# Patient Record
Sex: FEMALE | Race: WHITE
[De-identification: names, ages, dates, MRNs, and addresses within clinical notes are randomized per-mention and may not be internally consistent; named-entity substitution may affect disease eponyms.]

---

## 2020-10-18 ENCOUNTER — HOSPITAL ENCOUNTER (EMERGENCY)
Dept: HOSPITAL 74 - FER | Age: 50
LOS: 1 days | Discharge: HOME | End: 2020-10-19
Payer: COMMERCIAL

## 2020-10-18 VITALS — TEMPERATURE: 97.8 F | DIASTOLIC BLOOD PRESSURE: 70 MMHG | SYSTOLIC BLOOD PRESSURE: 130 MMHG | HEART RATE: 77 BPM

## 2020-10-18 VITALS — BODY MASS INDEX: 27.8 KG/M2

## 2020-10-18 DIAGNOSIS — R42: Primary | ICD-10-CM

## 2020-10-18 NOTE — PDOC
History of Present Illness





- General


Chief Complaint: Lightheaded


Stated Complaint: DIZZY, NAUSEA, HEADACHE


Time Seen by Provider: 10/18/20 23:01


History Source: Patient


Exam Limitations: No Limitations





- History of Present Illness


Initial Comments: 





10/18/20 23:06


This is a 50-year-old female who comes in complaining of carbon monoxide 

exposure.  Patient states she was working downstairs building something when she

suddenly developed some nausea, headache and feeling disoriented.  Patient does 

have a carbon monoxide detector in the area which did not go off.  Patient came 

in for evaluation of possible carbon monoxide exposure.  By the time patient got

here she was feeling better.  Otherwise patient was without complaints.





Allergies: as per nursing notes


Past Medical History: none


Social history: Lives with family. No smoking. No alcohol. No illicit drugs.


Surgical history: None





General:  No fevers or chills, no weakness, no weight loss 


HEENT: No change in vision.  No sore throat,. No ear pain


CardioVascular: no chest discomfort. No shortness of breath


Respiratory:No cough, or wheezing. 


Gastrointestinal:  no nausea, vomiting, diarrhea or constipation,  No rectal 

bleeding


Genitourinary:  No dysuria, hematuria, or frequency


Musculoskeletal:  No joint or muscle pain or swelling


Neurologic: No headache, vertigo, dizziness or loss of consciousness


Psychiatric: nor depression 


Skin: No rashes or easy bruising


Endocrine: no increased thirst or abnormal weight change


Allergic: no skin or latex allergy


All other systems reviewed and normal





Exam:





General: Well-nourished well-developed individual, no acute distress


HEENT: Throat: Normal, tonsils normal, no erythema or exudate


               Neck: Supple, no meningeal signs, no lymphadenopathy


Eyes::Pupils equal reactive and round, extraocular motion intact


Chest: Nontender to palpation 


Cardiac: S1-S2 normal, regular rate and rhythm, no murmurs rubs or gallops


Respiratory: Lungs clear to auscultation bilateral


Abdomen: Soft, nondistended, normal bowel sounds, there is no tenderness on 

palpation diffusely


Extremities: Warm, dry, no cyanosis, clubbing, or edema


Skin: No rashes


Neuro: Alert and oriented x3, CN II - XII intact, nonfocal exam with normal 

strength, normal sensation, normal reflexes, normal gait, 


Psych: Normal mood and affect


10/19/20 00:40


Assessment and plan: This is a 50-year-old female who comes in complaining of 

possible carbon monoxide exposure.  Patient's carbon monoxide level was 0.8 

normal is 0-2.  Patient also had a cardiogram which showed normal sinus rhythm 

no acute ST-T wave changes essentially normal EKG





Patient discharged we will follow-up with her primary care doctor





Past History





- Medical History


Allergies/Adverse Reactions: 


                                    Allergies











Allergy/AdvReac Type Severity Reaction Status Date / Time


 


No Known Allergies Allergy   Verified 10/18/20 22:45











Home Medications: 


Ambulatory Orders





Cetirizine HCl [Zyrtec] 10 mg PO DAILY 10/18/20 


Lansoprazole [Prevacid] 30 mg PO DAILY 10/18/20 


Multivitamin 1 each PO DAILY 10/18/20 








COPD: No


 Disorders: Yes


HTN: Yes (W/PREGNANCY)


Other medical history: ALLERGIES





- Reproductive History


Is Patient Pregnant Now?: No





- Psycho-Social/Smoking History


Smoking Status: No


Smoking History: Never smoked


Have you smoked in the past 12 months: No


Number of Cigarettes Smoked Daily: 0


Information on smoking cessation initiated: No





- Substance Abuse Hx (Audit-C & DAST Scrn)


How often the patient has a drink containing alcohol: Never


Score: In Men: 4 or > Positive; In Women: 3 or > Positive: 0


Screen Result (Pos requires Nsg. Audit-10AR): Negative


In the last yr the pt used illegal drug/Rx for NonMed reason: No


Score:  Yes response is considered Positive: 0


Screen Result (Positive result requires Nsg. DAST-10): Negative





*Physical Exam





- Vital Signs


                                Last Vital Signs











Temp Pulse Resp BP Pulse Ox


 


 97.8 F   77   15   130/70   98 


 


 10/18/20 22:49  10/18/20 22:49  10/18/20 22:49  10/18/20 22:49  10/18/20 22:49














Discharge





- Discharge Information


Problems reviewed: Yes


Clinical Impression/Diagnosis: 


 Dizziness





Condition: Stable


Disposition: HOME





- Admission


No





- Follow up/Referral





- Patient Discharge Instructions


Additional Instructions: 


Return to the emergency department immediately with ANY new, persistent or 

worsening symptoms.





Continue any medications as previously prescribed by your physician.





You should follow up with your primary doctor as soon as possible regarding 

today's emergency department visit.


.


Please make sure your doctor reviews the results of your emergency evaluation.





Thank you for coming to the   Emergency Department today for your care. It was a

 pleasure to see you today. Please note that your evaluation is INCOMPLETE until

 you  follow-up with your doctor. 





- Post Discharge Activity

## 2020-10-19 NOTE — EKG
Test Reason : 

Blood Pressure : ***/*** mmHG

Vent. Rate : 068 BPM     Atrial Rate : 068 BPM

   P-R Int : 154 ms          QRS Dur : 070 ms

    QT Int : 378 ms       P-R-T Axes : 048 -19 054 degrees

   QTc Int : 401 ms

 

NORMAL SINUS RHYTHM

NONSPECIFIC T WAVE ABNORMALITY

ABNORMAL ECG

NO PREVIOUS ECGS AVAILABLE

Confirmed by CATERINA YE MD ([id number]) on 10/19/2020 4:14:21 PM

 

Referred By: MD BROWNING           Confirmed By:CATERINA YE MD

## 2022-10-17 ENCOUNTER — HOSPITAL ENCOUNTER (OUTPATIENT)
Dept: HOSPITAL 74 - FASU-ENDO | Age: 52
Discharge: HOME | End: 2022-10-17
Attending: INTERNAL MEDICINE
Payer: COMMERCIAL

## 2022-10-17 VITALS — DIASTOLIC BLOOD PRESSURE: 67 MMHG | HEART RATE: 75 BPM | SYSTOLIC BLOOD PRESSURE: 110 MMHG

## 2022-10-17 VITALS — BODY MASS INDEX: 29.9 KG/M2

## 2022-10-17 VITALS — RESPIRATION RATE: 18 BRPM

## 2022-10-17 VITALS — TEMPERATURE: 97.7 F

## 2022-10-17 DIAGNOSIS — Z80.0: ICD-10-CM

## 2022-10-17 DIAGNOSIS — Z12.11: Primary | ICD-10-CM

## 2022-10-17 PROCEDURE — 0DJD8ZZ INSPECTION OF LOWER INTESTINAL TRACT, VIA NATURAL OR ARTIFICIAL OPENING ENDOSCOPIC: ICD-10-PCS | Performed by: INTERNAL MEDICINE
